# Patient Record
Sex: FEMALE | Race: WHITE | Employment: OTHER
[De-identification: names, ages, dates, MRNs, and addresses within clinical notes are randomized per-mention and may not be internally consistent; named-entity substitution may affect disease eponyms.]

---

## 2017-06-09 ENCOUNTER — RECORDS - HEALTHEAST (OUTPATIENT)
Dept: ADMINISTRATIVE | Facility: OTHER | Age: 67
End: 2017-06-09

## 2018-01-29 ENCOUNTER — RECORDS - HEALTHEAST (OUTPATIENT)
Dept: LAB | Facility: CLINIC | Age: 68
End: 2018-01-29

## 2018-01-29 LAB
ANION GAP SERPL CALCULATED.3IONS-SCNC: 11 MMOL/L (ref 5–18)
BUN SERPL-MCNC: 14 MG/DL (ref 8–22)
CALCIUM SERPL-MCNC: 10.2 MG/DL (ref 8.5–10.5)
CHLORIDE BLD-SCNC: 101 MMOL/L (ref 98–107)
CHOLEST SERPL-MCNC: 220 MG/DL
CO2 SERPL-SCNC: 29 MMOL/L (ref 22–31)
CREAT SERPL-MCNC: 0.85 MG/DL (ref 0.6–1.1)
FASTING STATUS PATIENT QL REPORTED: ABNORMAL
GFR SERPL CREATININE-BSD FRML MDRD: >60 ML/MIN/1.73M2
GLUCOSE BLD-MCNC: 95 MG/DL (ref 70–125)
HDLC SERPL-MCNC: 66 MG/DL
LDLC SERPL CALC-MCNC: 101 MG/DL
POTASSIUM BLD-SCNC: 4.2 MMOL/L (ref 3.5–5)
SODIUM SERPL-SCNC: 141 MMOL/L (ref 136–145)
TRIGL SERPL-MCNC: 266 MG/DL
TSH SERPL DL<=0.005 MIU/L-ACNC: 2.47 UIU/ML (ref 0.3–5)

## 2019-02-05 ENCOUNTER — RECORDS - HEALTHEAST (OUTPATIENT)
Dept: LAB | Facility: CLINIC | Age: 69
End: 2019-02-05

## 2019-02-05 LAB
ANION GAP SERPL CALCULATED.3IONS-SCNC: 13 MMOL/L (ref 5–18)
BUN SERPL-MCNC: 21 MG/DL (ref 8–22)
CALCIUM SERPL-MCNC: 10.7 MG/DL (ref 8.5–10.5)
CHLORIDE BLD-SCNC: 102 MMOL/L (ref 98–107)
CHOLEST SERPL-MCNC: 251 MG/DL
CO2 SERPL-SCNC: 25 MMOL/L (ref 22–31)
CREAT SERPL-MCNC: 0.91 MG/DL (ref 0.6–1.1)
FASTING STATUS PATIENT QL REPORTED: ABNORMAL
GFR SERPL CREATININE-BSD FRML MDRD: >60 ML/MIN/1.73M2
GLUCOSE BLD-MCNC: 94 MG/DL (ref 70–125)
HDLC SERPL-MCNC: 80 MG/DL
LDLC SERPL CALC-MCNC: 139 MG/DL
POTASSIUM BLD-SCNC: 4 MMOL/L (ref 3.5–5)
SODIUM SERPL-SCNC: 140 MMOL/L (ref 136–145)
TRIGL SERPL-MCNC: 161 MG/DL
TSH SERPL DL<=0.005 MIU/L-ACNC: 2.35 UIU/ML (ref 0.3–5)

## 2019-09-23 ENCOUNTER — HOSPITAL ENCOUNTER (EMERGENCY)
Facility: CLINIC | Age: 69
Discharge: HOME | End: 2019-09-23
Attending: EMERGENCY MEDICINE | Admitting: EMERGENCY MEDICINE
Payer: COMMERCIAL

## 2019-09-23 ENCOUNTER — APPOINTMENT (OUTPATIENT)
Dept: GENERAL RADIOLOGY | Facility: CLINIC | Age: 69
End: 2019-09-23
Attending: EMERGENCY MEDICINE
Payer: COMMERCIAL

## 2019-09-23 VITALS
DIASTOLIC BLOOD PRESSURE: 83 MMHG | TEMPERATURE: 97.4 F | SYSTOLIC BLOOD PRESSURE: 159 MMHG | OXYGEN SATURATION: 97 % | HEART RATE: 66 BPM | RESPIRATION RATE: 12 BRPM

## 2019-09-23 DIAGNOSIS — M25.522 PAIN IN JOINT INVOLVING UPPER ARM, LEFT: ICD-10-CM

## 2019-09-23 DIAGNOSIS — R07.9 CHEST PAIN, UNSPECIFIED TYPE: ICD-10-CM

## 2019-09-23 LAB
ANION GAP SERPL CALCULATED.3IONS-SCNC: 7 MMOL/L (ref 3–14)
BASOPHILS # BLD AUTO: 0.1 10E9/L (ref 0–0.2)
BASOPHILS NFR BLD AUTO: 1.1 %
BUN SERPL-MCNC: 16 MG/DL (ref 7–30)
CALCIUM SERPL-MCNC: 9.1 MG/DL (ref 8.5–10.1)
CHLORIDE SERPL-SCNC: 103 MMOL/L (ref 94–109)
CO2 SERPL-SCNC: 28 MMOL/L (ref 20–32)
CREAT SERPL-MCNC: 0.96 MG/DL (ref 0.52–1.04)
D DIMER PPP FEU-MCNC: 0.3 UG/ML FEU (ref 0–0.5)
DIFFERENTIAL METHOD BLD: NORMAL
EOSINOPHIL # BLD AUTO: 0.1 10E9/L (ref 0–0.7)
EOSINOPHIL NFR BLD AUTO: 1.4 %
ERYTHROCYTE [DISTWIDTH] IN BLOOD BY AUTOMATED COUNT: 12.9 % (ref 10–15)
GFR SERPL CREATININE-BSD FRML MDRD: 61 ML/MIN/{1.73_M2}
GLUCOSE SERPL-MCNC: 110 MG/DL (ref 70–99)
HCT VFR BLD AUTO: 43.7 % (ref 35–47)
HGB BLD-MCNC: 14.1 G/DL (ref 11.7–15.7)
IMM GRANULOCYTES # BLD: 0 10E9/L (ref 0–0.4)
IMM GRANULOCYTES NFR BLD: 0.1 %
LYMPHOCYTES # BLD AUTO: 1.5 10E9/L (ref 0.8–5.3)
LYMPHOCYTES NFR BLD AUTO: 18.7 %
MCH RBC QN AUTO: 30.3 PG (ref 26.5–33)
MCHC RBC AUTO-ENTMCNC: 32.3 G/DL (ref 31.5–36.5)
MCV RBC AUTO: 94 FL (ref 78–100)
MONOCYTES # BLD AUTO: 0.5 10E9/L (ref 0–1.3)
MONOCYTES NFR BLD AUTO: 6.5 %
NEUTROPHILS # BLD AUTO: 5.8 10E9/L (ref 1.6–8.3)
NEUTROPHILS NFR BLD AUTO: 72.2 %
NRBC # BLD AUTO: 0 10*3/UL
NRBC BLD AUTO-RTO: 0 /100
PLATELET # BLD AUTO: 221 10E9/L (ref 150–450)
POTASSIUM SERPL-SCNC: 3.6 MMOL/L (ref 3.4–5.3)
RBC # BLD AUTO: 4.66 10E12/L (ref 3.8–5.2)
SODIUM SERPL-SCNC: 138 MMOL/L (ref 133–144)
TROPONIN I SERPL-MCNC: <0.015 UG/L (ref 0–0.04)
WBC # BLD AUTO: 8 10E9/L (ref 4–11)

## 2019-09-23 PROCEDURE — 80048 BASIC METABOLIC PNL TOTAL CA: CPT | Performed by: EMERGENCY MEDICINE

## 2019-09-23 PROCEDURE — 84484 ASSAY OF TROPONIN QUANT: CPT | Performed by: EMERGENCY MEDICINE

## 2019-09-23 PROCEDURE — 85379 FIBRIN DEGRADATION QUANT: CPT | Performed by: EMERGENCY MEDICINE

## 2019-09-23 PROCEDURE — 85025 COMPLETE CBC W/AUTO DIFF WBC: CPT | Performed by: EMERGENCY MEDICINE

## 2019-09-23 PROCEDURE — 99285 EMERGENCY DEPT VISIT HI MDM: CPT | Mod: 25

## 2019-09-23 PROCEDURE — 93005 ELECTROCARDIOGRAM TRACING: CPT

## 2019-09-23 PROCEDURE — 71046 X-RAY EXAM CHEST 2 VIEWS: CPT

## 2019-09-23 RX ORDER — BUPROPION HYDROCHLORIDE 150 MG/1
TABLET ORAL
COMMUNITY
Start: 2017-10-11

## 2019-09-23 RX ORDER — LEVOTHYROXINE SODIUM 100 UG/1
100 TABLET ORAL
COMMUNITY
Start: 2017-10-03

## 2019-09-23 RX ORDER — CITALOPRAM HYDROBROMIDE 10 MG/1
TABLET ORAL
COMMUNITY
Start: 2017-10-11

## 2019-09-23 ASSESSMENT — ENCOUNTER SYMPTOMS
SHORTNESS OF BREATH: 0
MYALGIAS: 1

## 2019-09-23 NOTE — ED TRIAGE NOTES
Pt states that she has been having pain in the left shoulder pain that feels like its more her muscle. Pt states that she feels her heart is racing because shes in the ER. Initial BP is 204/107 and HR of 103. Pt states that her daughter told her to come in. Alert and oriented x4

## 2019-09-23 NOTE — ED PROVIDER NOTES
History     Chief Complaint:  Arm Pain     HPI   Dea Rider is a 69 year old female who presents to the emergency department for evaluation of left arm pain. The patient reports she went to bed last night at baseline but awoke this morning around 0630 with left upper arm pain, accompanied by mild chest pain, worse with deep breaths. The chest pain improves at rest and does not change with movement; the arm pain worsens with movement of the arm. She states this pain worsened as the day progressed, and it worsened further when she helped her neighbor get into her car around 1230 today. The patient called her PCP out of concern for these symptoms and was instructed to present to the ED. She denies any recent travel. She also denies any shortness of breath.    Cardiac Risk Factors:  SEX:              female  Tobacco:              Negative  Hypertension:       Negative  Diabetes:             Negative  Lipids:                Negative  Personal history:  Negative  Family History:       Negative    PE and DVT Risk Factors:  Personal hx of PE/DVT:  Negative  Family hx of PE/DVT:  Negative  Recent travel:    Negative  Recent surgery:   Negative  Other immobilizations:  Negative  Hx cancer:    Negative  Hormone use:   Negative     Allergies:  Codeine  Morphine  Penicillins     Medications:    Wellbutrin  Celexa  Levothyroxine     Past Medical History:    Depression  Anxiety  Hypothyroidism    Past Surgical History:    Mastectomy   Tonsillectomy    Family History:    No past pertinent family history.    Social History:  Presents with son, daughter, son-in-law, and granddaughter.  Never smoker.  Positive for alcohol use.   Marital Status:       Review of Systems   Respiratory: Negative for shortness of breath.    Cardiovascular: Positive for chest pain.   Musculoskeletal: Positive for myalgias.   All other systems reviewed and are negative.    Physical Exam     Patient Vitals for the past 24 hrs:   BP Temp Temp src Pulse  Heart Rate Resp SpO2   09/23/19 1900 (!) 175/92 -- -- 63 74 24 99 %   09/23/19 1830 -- -- -- 67 73 -- 96 %   09/23/19 1815 (!) 138/108 -- -- 66 69 -- 93 %   09/23/19 1800 (!) 151/66 -- -- 69 68 -- 93 %   09/23/19 1745 (!) 121/111 -- -- 74 75 -- 98 %   09/23/19 1730 (!) 147/83 -- -- 72 71 -- 97 %   09/23/19 1715 (!) 159/92 -- -- 75 89 -- 98 %   09/23/19 1700 (!) 158/83 -- -- 79 78 -- 99 %   09/23/19 1645 (!) 179/111 -- -- 91 85 -- 100 %   09/23/19 1630 (!) 174/91 -- -- 97 -- -- 98 %   09/23/19 1622 (!) 204/107 97.4  F (36.3  C) Oral -- 103 18 100 %     Physical Exam  General: Patient is alert and interactive when I enter the room  Head:  The scalp, face, and head appear normal  Eyes:  Conjunctivae are normal  ENT:    The nose is normal    Pinnae are normal    External acoustic canals are normal  Neck:  Trachea midline  CV:  Pulses are normal, RRR.    Resp:  No respiratory distress, CTAB  Abdomen:      Soft, non-tender, non-distended  Musc:  Normal muscular tone    No major joint effusions    No asymmetric leg swelling  Skin:  No rash or lesions noted  Neuro:  Speech is normal and fluent. Face is symmetric.     Moving all extremities well.   Psych: Awake. Alert.  Normal affect.  Appropriate interactions.    Emergency Department Course   ECG:  Time: 1624  Vent. Rate 94 bpm. MI interval 152. QRS duration 114. QT/QTc 354/442. P-R-T axis 65 -22 82.  Normal sinus rhythm with sinus arrhythmia.  Minimal voltage criteria for LVH, may be normal variant.  Borderline ECG.  Read time: 1625    Imaging:  Radiographic findings were communicated with the patient who voiced understanding of the findings.    XR Chest, 2 Views:  Negative chest.  As per radiology.    Laboratory:  CBC: WBC: 8.0, HGB: 14.14, PLT: 221  BMP: Glucose 110 (H), o/w WNL (Creatinine: 0.9)    D dimer: 0.3    1637 Troponin I: <0.015    Emergency Department Course:  Nursing notes and vitals reviewed. 1649 I performed an exam of the patient as documented above.      EKG obtained in the ED, see results above.     Blood drawn. This was sent to the lab for further testing, results above.    The patient was sent for a XR Chest while in the emergency department, findings above.     2007 I rechecked the patient and discussed the results of her workup thus far.     Findings and plan explained to the Patient. Patient discharged home with instructions regarding supportive care, medications, and reasons to return. The importance of close follow-up was reviewed.     I personally reviewed the laboratory results with the Patient and answered all related questions prior to discharge.    Impression & Plan      Medical Decision Making:  Dea Rider is a 69 year old female who presents with chest pain.  Today's work up in the Emergency Department is negative.  The differential diagnosis of chest pain is broad and includes life threatening etiologies such as acute coronary syndrome, myocardial infarction, pulmonary embolism, acute aortic dissection, amongst others.  The patient's EKG was nonischemic and troponin was normal.  The chest pain symptom complex would be atypical for coronary ischemia.  The patient is low risk for PE and has a negative D-dimer; I feel the risks of CT imaging outweighs any benefits.  Chest xray reveals no evidence of pneumonia or pneumothorax.  No serious etiology for the chest pain were detected today during this visit.  Since patient has had several hours (> 6 hours) of sonia, I think a one time troponin is sufficient. Given her age and HTN here, I do think it would be reasonable to get an outpatient stress test. This was ordered. Patient should follow up with primary care is indicated should the pain continue, as further work up may be performed; this was made clear to the patient, who understands.     Diagnosis:    ICD-10-CM   1. Chest pain, unspecified type R07.9   2. Pain in joint involving upper arm, left M25.522       Disposition:  discharged to home    I,  Gurdeep Donohue, am serving as a scribe on 9/23/2019 at 4:26 PM to personally document services performed by Patricia Mittal MD based on my observations and the provider's statements to me.     Gurdeep Donohue  9/23/2019   [facility] EMERGENCY DEPARTMENT       Patricia Mittal MD  09/26/19 1249

## 2019-09-23 NOTE — ED AVS SNAPSHOT
[facility] Emergency Department  [address]  [facility] [address]  Phone:  [phone]  Fax:  [phone]                                    Dea Rider   MRN: [id number]    Department:  [facility] Emergency Department   Date of Visit:  9/23/2019           After Visit Summary Signature Page    I have received my discharge instructions, and my questions have been answered. I have discussed any challenges I see with this plan with the nurse or doctor.    ..........................................................................................................................................  Patient/Patient Representative Signature      ..........................................................................................................................................  Patient Representative Print Name and Relationship to Patient    ..................................................               ................................................  Date                                   Time    ..........................................................................................................................................  Reviewed by Signature/Title    ...................................................              ..............................................  Date                                               Time          22EPIC Rev 08/18

## 2019-09-24 LAB — INTERPRETATION ECG - MUSE: NORMAL

## 2019-09-24 NOTE — ED NOTES
PIV removed, VSS, Pt verbalized understanding of discharge instructions and ambulated to lobby with steady gait.

## 2019-09-25 ENCOUNTER — HOSPITAL ENCOUNTER (OUTPATIENT)
Dept: CARDIOLOGY | Facility: CLINIC | Age: 69
Discharge: HOME | End: 2019-09-25
Attending: EMERGENCY MEDICINE | Admitting: EMERGENCY MEDICINE
Payer: COMMERCIAL

## 2019-09-25 DIAGNOSIS — R07.9 CHEST PAIN, UNSPECIFIED TYPE: ICD-10-CM

## 2019-09-25 PROCEDURE — 93016 CV STRESS TEST SUPVJ ONLY: CPT | Performed by: INTERNAL MEDICINE

## 2019-09-25 PROCEDURE — 93350 STRESS TTE ONLY: CPT | Mod: 26 | Performed by: INTERNAL MEDICINE

## 2019-09-25 PROCEDURE — 93018 CV STRESS TEST I&R ONLY: CPT | Performed by: INTERNAL MEDICINE

## 2019-09-25 PROCEDURE — 93321 DOPPLER ECHO F-UP/LMTD STD: CPT | Mod: 26 | Performed by: INTERNAL MEDICINE

## 2019-09-25 PROCEDURE — 40000264 ECHO STRESS ECHOCARDIOGRAM

## 2019-09-25 PROCEDURE — 25500064 ZZH RX 255 OP 636: Performed by: EMERGENCY MEDICINE

## 2019-09-25 PROCEDURE — 93325 DOPPLER ECHO COLOR FLOW MAPG: CPT | Mod: 26 | Performed by: INTERNAL MEDICINE

## 2019-09-25 RX ADMIN — HUMAN ALBUMIN MICROSPHERES AND PERFLUTREN 9 ML: 10; .22 INJECTION, SOLUTION INTRAVENOUS at 15:35

## 2019-09-26 PROBLEM — Z85.3 PERSONAL HISTORY OF MALIGNANT NEOPLASM OF BREAST: Status: ACTIVE | Noted: 2019-09-26

## 2019-09-26 PROBLEM — E03.9 ACQUIRED HYPOTHYROIDISM: Status: ACTIVE | Noted: 2019-09-26

## 2019-09-26 PROBLEM — F51.05 INSOMNIA RELATED TO ANOTHER MENTAL DISORDER: Status: ACTIVE | Noted: 2019-09-26

## 2019-09-26 PROBLEM — J44.9 CHRONIC AIRWAY OBSTRUCTION (H): Status: ACTIVE | Noted: 2019-09-26

## 2019-09-26 PROBLEM — F41.9 ANXIETY: Status: ACTIVE | Noted: 2019-09-26

## 2019-09-26 PROBLEM — J30.9 ALLERGIC RHINITIS: Status: ACTIVE | Noted: 2019-09-26

## 2019-09-26 PROBLEM — R03.0 ELEVATED BP WITHOUT DIAGNOSIS OF HYPERTENSION: Status: ACTIVE | Noted: 2019-09-26

## 2019-09-26 PROBLEM — G47.30 SLEEP APNEA: Status: ACTIVE | Noted: 2019-09-26

## 2019-09-26 PROBLEM — E78.5 HYPERLIPIDEMIA: Status: ACTIVE | Noted: 2019-09-26

## 2019-09-26 PROBLEM — N84.0 ENDOMETRIAL POLYP: Status: ACTIVE | Noted: 2019-09-26

## 2019-09-26 PROBLEM — F33.40 RECURRENT MAJOR DEPRESSION IN REMISSION (H): Status: ACTIVE | Noted: 2019-09-26

## 2019-10-03 ENCOUNTER — OFFICE VISIT (OUTPATIENT)
Dept: CARDIOLOGY | Facility: CLINIC | Age: 69
End: 2019-10-03
Payer: COMMERCIAL

## 2019-10-03 VITALS
SYSTOLIC BLOOD PRESSURE: 138 MMHG | WEIGHT: 189.4 LBS | HEIGHT: 66 IN | HEART RATE: 80 BPM | DIASTOLIC BLOOD PRESSURE: 76 MMHG | BODY MASS INDEX: 30.44 KG/M2

## 2019-10-03 DIAGNOSIS — R07.9 CHEST PAIN, UNSPECIFIED TYPE: Primary | ICD-10-CM

## 2019-10-03 DIAGNOSIS — R94.39 ABNORMAL CARDIOVASCULAR STRESS TEST: ICD-10-CM

## 2019-10-03 PROCEDURE — 99204 OFFICE O/P NEW MOD 45 MIN: CPT | Performed by: INTERNAL MEDICINE

## 2019-10-03 RX ORDER — VITS A,C,E/LUTEIN/MINERALS 300MCG-200
1 TABLET ORAL DAILY
COMMUNITY

## 2019-10-03 ASSESSMENT — MIFFLIN-ST. JEOR: SCORE: 1410.86

## 2019-10-03 NOTE — PROGRESS NOTES
HPI and Plan:   See dictation    Orders Placed This Encounter   Procedures     CTA Angiogram coronary artery       Orders Placed This Encounter   Medications     TRAZODONE HCL PO     Sig: Take by mouth as needed for sleep     Multiple Vitamins-Minerals (MULTIVITAMIN PO)     Sig: Take by mouth daily     aspirin (ASA) 81 MG tablet     Sig: Take 81 mg by mouth daily     Cholecalciferol (VITAMIN D3 PO)     Sig: Take 600 Units by mouth daily     multivitamin (OCUVITE) TABS tablet     Sig: Take 1 tablet by mouth daily       There are no discontinued medications.      Encounter Diagnoses   Name Primary?     Chest pain, unspecified type Yes     Abnormal cardiovascular stress test        CURRENT MEDICATIONS:  Current Outpatient Medications   Medication Sig Dispense Refill     aspirin (ASA) 81 MG tablet Take 81 mg by mouth daily       buPROPion (WELLBUTRIN XL) 150 MG 24 hr tablet TAKE 1 TABLET BY MOUTH EVERY 24 HOURS       Cholecalciferol (VITAMIN D3 PO) Take 600 Units by mouth daily       citalopram (CELEXA) 10 MG tablet TAKE 1 TABLET BY MOUTH DAILY FOR 90 DAYS       levothyroxine (SYNTHROID/LEVOTHROID) 100 MCG tablet Take 100 mcg by mouth       Multiple Vitamins-Minerals (MULTIVITAMIN PO) Take by mouth daily       multivitamin (OCUVITE) TABS tablet Take 1 tablet by mouth daily       TRAZODONE HCL PO Take by mouth as needed for sleep         ALLERGIES     Allergies   Allergen Reactions     Codeine Nausea and Vomiting     Morphine Nausea and Vomiting     Penicillins Hives       PAST MEDICAL HISTORY:  Past Medical History:   Diagnosis Date     Elevated BP without diagnosis of hypertension 9/26/2019     Endometrial polyp 9/26/2019       PAST SURGICAL HISTORY:  No past surgical history on file.    FAMILY HISTORY:  Family History   Problem Relation Age of Onset     Pancreatic Cancer Mother      Heart Failure Father      No Known Problems Sister      No Known Problems Brother      No Known Problems Maternal Grandmother      Other  - See Comments Maternal Grandfather         epilepsy     Heart Failure Paternal Grandmother      Cerebral aneurysm Paternal Grandfather      No Known Problems Sister        SOCIAL HISTORY:  Social History     Socioeconomic History     Marital status:      Spouse name: None     Number of children: None     Years of education: None     Highest education level: None   Occupational History     None   Social Needs     Financial resource strain: None     Food insecurity:     Worry: None     Inability: None     Transportation needs:     Medical: None     Non-medical: None   Tobacco Use     Smoking status: Never Smoker     Smokeless tobacco: Never Used   Substance and Sexual Activity     Alcohol use: Yes     Comment: 1-2 glasses of wine nightly     Drug use: None     Sexual activity: None   Lifestyle     Physical activity:     Days per week: None     Minutes per session: None     Stress: None   Relationships     Social connections:     Talks on phone: None     Gets together: None     Attends Christian service: None     Active member of club or organization: None     Attends meetings of clubs or organizations: None     Relationship status: None     Intimate partner violence:     Fear of current or ex partner: None     Emotionally abused: None     Physically abused: None     Forced sexual activity: None   Other Topics Concern     None   Social History Narrative     None       Review of Systems:  Skin:  Positive for bruising     Eyes:  Positive for glasses    ENT:  Positive for sinus trouble allergies  Respiratory:  Positive for sleep apnea currently lost her mouth guard   Cardiovascular:    Positive for;chest pain;fatigue pain in left arm  Gastroenterology: Negative      Genitourinary:  Negative      Musculoskeletal:  Positive for joint pain;arthritis;nocturnal cramping 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  Neurologic:  Negative      Psychiatric:  Positive for excessive stress;anxiety;depression;sleep disturbances    Heme/Lymph/Imm:  " Positive for allergies RX  Endocrine:  Positive for thyroid disorder      Physical Exam:  Vitals: /76 (BP Location: Right arm, Patient Position: Chair, Cuff Size: Adult Regular)   Pulse 80   Ht 1.676 m (5' 6\")   Wt 85.9 kg (189 lb 6.4 oz)   BMI 30.57 kg/m      Constitutional:  cooperative, alert and oriented, well developed, well nourished, in no acute distress overweight      Skin:  warm and dry to the touch          Head:  normocephalic        Eyes:  pupils equal and round        Lymph:No Cervical lymphadenopathy present     ENT:           Neck:  carotid pulses are full and equal bilaterally, JVP normal, no carotid bruit        Respiratory:  normal breath sounds, clear to auscultation, normal A-P diameter, normal symmetry, normal respiratory excursion, no use of accessory muscles         Cardiac: regular rhythm, normal S1/S2, no S3 or S4, apical impulse not displaced, no murmurs, gallops or rubs                pulses full and equal                                        GI:  not assessed this visit        Extremities and Muscular Skeletal:  no deformities, clubbing, cyanosis, erythema observed;no edema              Neurological:  no gross motor deficits;affect appropriate        Psych:  Alert and Oriented x 3        CC  Anna Kam MD  [facility]  [address] [place] [address]              "

## 2019-10-03 NOTE — LETTER
10/3/2019      Anna Kam MD  [facility] [address]  [facility] [address]      RE: Dea Rider       Dear Colleague,    I had the pleasure of seeing Dea Rider in the [facility].    Service Date: 10/03/2019      CARDIOLOGY CONSULTATION      PRIMARY CARE PHYSICIAN:  Anna Kam MD.      INDICATION FOR CARDIAC CONSULTATION:  Chest discomfort and abnormal stress echocardiogram.      HISTORY OF PRESENT ILLNESS:  It is my pleasure to see your patient, Dea Rider.  She is an extremely nice 69-year-old patient who developed chest discomfort approximately a week and a half ago.  The patient was walking with her granddaughter and their dog and they came across their neighbor who fell and landed on her face.  When the person's  came around in his car to help his wife, the patient helped in lifting the patient into the car.  The following morning, she woke up with discomfort on the right side of her chest and in her left arm.  The discomfort had no aggravating or relieving factors.  It was there for hours.  She ruled out for myocardial infarct with a troponin of less than 0.015.  EKG at the time of the chest discomfort was essentially normal with no ischemic changes.  She was then referred for stress echocardiogram which was performed on 09/25.  The patient was only able to exercise for 4 minutes 30 seconds which reduces the accuracy of the test.  Six minutes of exercise is typically regarded as being a sufficient amount of exercise.  The patient had no chest pain during the stress test, but the image quality was difficult and it was felt by the reader that there was possibly evidence of apical lateral wall hypokinesis.  The stress EKG was normal.  The Duke treadmill score was intermediate risk.  The patient still has some discomfort in the chest and in the arm.  It is significantly better now, however.  She has few risk factors for  coronary artery disease.  Though there are no lipids available in the chart, she tells me that her lipids are normal with the exception of mild hypertriglyceridemia.  There is no family history of coronary artery disease.  Her father did die of congestive heart failure.  She never smoked, she is not diabetic, and she is not hypertensive.  Her blood pressure today was 138/76.      IMPRESSION:   1.  Abnormal stress echocardiogram and chest discomfort.  The inability to achieve 6 minutes of exercise firstly is an abnormal factor on the stress test.  Secondly, there is the possibility that there is a wall motion abnormality on the stress echocardiogram with the apical lateral wall, possibly being hypokinetic.  However, the chest discomfort is atypical and may have been caused by lifting by lifting a person who had fallen.  She does have some risk factors for coronary artery disease, but few.   2.  Mild obesity with a BMI of 30.57.      PLAN:  We will obtain a CT angiogram to determine if there is flow-limiting coronary artery disease.  I have explained to the patient that this is performed in [place] and she is willing to go there.  We will call the patient with the results of the CT scan.  Obviously, if the CT scan shows a flow-limiting lesion, we will be seeing her very soon and would probably be ordering an invasive coronary angiogram in that situation.  It has been my pleasure to be involved in the care of this very nice patient.  We look forward to seeing her again.      cc:   [place] [address]         JANAY SALAZAR MD, [facility]             D: 10/03/2019   T: 10/03/2019   MT: SATHYA      Name:     CHLOE VILLEGAS   MRN:      [id number]        Account:      [id number]   :      1950           Service Date: 10/03/2019      Document: [id number]         Outpatient Encounter Medications as of 10/3/2019   Medication  Sig Dispense Refill     aspirin (ASA) 81 MG tablet Take 81 mg by mouth daily       buPROPion (WELLBUTRIN XL) 150 MG 24 hr tablet TAKE 1 TABLET BY MOUTH EVERY 24 HOURS       Cholecalciferol (VITAMIN D3 PO) Take 600 Units by mouth daily       citalopram (CELEXA) 10 MG tablet TAKE 1 TABLET BY MOUTH DAILY FOR 90 DAYS       levothyroxine (SYNTHROID/LEVOTHROID) 100 MCG tablet Take 100 mcg by mouth       Multiple Vitamins-Minerals (MULTIVITAMIN PO) Take by mouth daily       multivitamin (OCUVITE) TABS tablet Take 1 tablet by mouth daily       TRAZODONE HCL PO Take by mouth as needed for sleep       No facility-administered encounter medications on file as of 10/3/2019.        Again, thank you for allowing me to participate in the care of your patient.      Sincerely,    Jacob Santiago MD, MD     [facility]

## 2019-10-03 NOTE — PROGRESS NOTES
Service Date: 10/03/2019      CARDIOLOGY CONSULTATION      PRIMARY CARE PHYSICIAN:  Anna Kam MD.      INDICATION FOR CARDIAC CONSULTATION:  Chest discomfort and abnormal stress echocardiogram.      HISTORY OF PRESENT ILLNESS:  It is my pleasure to see your patient, Dea Rider.  She is an extremely nice 69-year-old patient who developed chest discomfort approximately a week and a half ago.  The patient was walking with her granddaughter and their dog and they came across their neighbor who fell and landed on her face.  When the person's  came around in his car to help his wife, the patient helped in lifting the patient into the car.  The following morning, she woke up with discomfort on the right side of her chest and in her left arm.  The discomfort had no aggravating or relieving factors.  It was there for hours.  She ruled out for myocardial infarct with a troponin of less than 0.015.  EKG at the time of the chest discomfort was essentially normal with no ischemic changes.  She was then referred for stress echocardiogram which was performed on 09/25.  The patient was only able to exercise for 4 minutes 30 seconds which reduces the accuracy of the test.  Six minutes of exercise is typically regarded as being a sufficient amount of exercise.  The patient had no chest pain during the stress test, but the image quality was difficult and it was felt by the reader that there was possibly evidence of apical lateral wall hypokinesis.  The stress EKG was normal.  The Duke treadmill score was intermediate risk.  The patient still has some discomfort in the chest and in the arm.  It is significantly better now, however.  She has few risk factors for coronary artery disease.  Though there are no lipids available in the chart, she tells me that her lipids are normal with the exception of mild hypertriglyceridemia.  There is no family history of coronary artery disease.  Her father did die of congestive  heart failure.  She never smoked, she is not diabetic, and she is not hypertensive.  Her blood pressure today was 138/76.      IMPRESSION:   1.  Abnormal stress echocardiogram and chest discomfort.  The inability to achieve 6 minutes of exercise firstly is an abnormal factor on the stress test.  Secondly, there is the possibility that there is a wall motion abnormality on the stress echocardiogram with the apical lateral wall, possibly being hypokinetic.  However, the chest discomfort is atypical and may have been caused by lifting by lifting a person who had fallen.  She does have some risk factors for coronary artery disease, but few.   2.  Mild obesity with a BMI of 30.57.      PLAN:  We will obtain a CT angiogram to determine if there is flow-limiting coronary artery disease.  I have explained to the patient that this is performed in [place] and she is willing to go there.  We will call the patient with the results of the CT scan.  Obviously, if the CT scan shows a flow-limiting lesion, we will be seeing her very soon and would probably be ordering an invasive coronary angiogram in that situation.  It has been my pleasure to be involved in the care of this very nice patient.  We look forward to seeing her again.      cc:   Anna Kam MD   [address]  [address]         JANAY SALAZAR MD, [facility]             D: 10/03/2019   T: 10/03/2019   MT: SATHYA      Name:     CHLOE VILLEGAS   MRN:      [id number]        Account:      [id number]   :      1950           Service Date: 10/03/2019      Document: [id number]

## 2019-10-03 NOTE — LETTER
10/3/2019    Anna Kam MD  [facility] [address] [facility] [address]    RE: Dea Rider       Dear Colleague,    I had the pleasure of seeing Dea Rider in the [facility].    HPI and Plan:   See dictation    Orders Placed This Encounter   Procedures     CTA Angiogram coronary artery       Orders Placed This Encounter   Medications     TRAZODONE HCL PO     Sig: Take by mouth as needed for sleep     Multiple Vitamins-Minerals (MULTIVITAMIN PO)     Sig: Take by mouth daily     aspirin (ASA) 81 MG tablet     Sig: Take 81 mg by mouth daily     Cholecalciferol (VITAMIN D3 PO)     Sig: Take 600 Units by mouth daily     multivitamin (OCUVITE) TABS tablet     Sig: Take 1 tablet by mouth daily       There are no discontinued medications.      Encounter Diagnoses   Name Primary?     Chest pain, unspecified type Yes     Abnormal cardiovascular stress test        CURRENT MEDICATIONS:  Current Outpatient Medications   Medication Sig Dispense Refill     aspirin (ASA) 81 MG tablet Take 81 mg by mouth daily       buPROPion (WELLBUTRIN XL) 150 MG 24 hr tablet TAKE 1 TABLET BY MOUTH EVERY 24 HOURS       Cholecalciferol (VITAMIN D3 PO) Take 600 Units by mouth daily       citalopram (CELEXA) 10 MG tablet TAKE 1 TABLET BY MOUTH DAILY FOR 90 DAYS       levothyroxine (SYNTHROID/LEVOTHROID) 100 MCG tablet Take 100 mcg by mouth       Multiple Vitamins-Minerals (MULTIVITAMIN PO) Take by mouth daily       multivitamin (OCUVITE) TABS tablet Take 1 tablet by mouth daily       TRAZODONE HCL PO Take by mouth as needed for sleep         ALLERGIES     Allergies   Allergen Reactions     Codeine Nausea and Vomiting     Morphine Nausea and Vomiting     Penicillins Hives       PAST MEDICAL HISTORY:  Past Medical History:   Diagnosis Date     Elevated BP without diagnosis of hypertension 9/26/2019     Endometrial polyp 9/26/2019       PAST SURGICAL HISTORY:  No past surgical  history on file.    FAMILY HISTORY:  Family History   Problem Relation Age of Onset     Pancreatic Cancer Mother      Heart Failure Father      No Known Problems Sister      No Known Problems Brother      No Known Problems Maternal Grandmother      Other - See Comments Maternal Grandfather         epilepsy     Heart Failure Paternal Grandmother      Cerebral aneurysm Paternal Grandfather      No Known Problems Sister        SOCIAL HISTORY:  Social History     Socioeconomic History     Marital status:      Spouse name: None     Number of children: None     Years of education: None     Highest education level: None   Occupational History     None   Social Needs     Financial resource strain: None     Food insecurity:     Worry: None     Inability: None     Transportation needs:     Medical: None     Non-medical: None   Tobacco Use     Smoking status: Never Smoker     Smokeless tobacco: Never Used   Substance and Sexual Activity     Alcohol use: Yes     Comment: 1-2 glasses of wine nightly     Drug use: None     Sexual activity: None   Lifestyle     Physical activity:     Days per week: None     Minutes per session: None     Stress: None   Relationships     Social connections:     Talks on phone: None     Gets together: None     Attends Shinto service: None     Active member of club or organization: None     Attends meetings of clubs or organizations: None     Relationship status: None     Intimate partner violence:     Fear of current or ex partner: None     Emotionally abused: None     Physically abused: None     Forced sexual activity: None   Other Topics Concern     None   Social History Narrative     None       Review of Systems:  Skin:  Positive for bruising     Eyes:  Positive for glasses    ENT:  Positive for sinus trouble allergies  Respiratory:  Positive for sleep apnea currently lost her mouth guard   Cardiovascular:    Positive for;chest pain;fatigue pain in left arm  Gastroenterology: Negative     "  Genitourinary:  Negative      Musculoskeletal:  Positive for joint pain;arthritis;nocturnal cramping 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  Neurologic:  Negative      Psychiatric:  Positive for excessive stress;anxiety;depression;sleep disturbances    Heme/Lymph/Imm:  Positive for allergies RX  Endocrine:  Positive for thyroid disorder      Physical Exam:  Vitals: /76 (BP Location: Right arm, Patient Position: Chair, Cuff Size: Adult Regular)   Pulse 80   Ht 1.676 m (5' 6\")   Wt 85.9 kg (189 lb 6.4 oz)   BMI 30.57 kg/m       Constitutional:  cooperative, alert and oriented, well developed, well nourished, in no acute distress overweight      Skin:  warm and dry to the touch          Head:  normocephalic        Eyes:  pupils equal and round        Lymph:No Cervical lymphadenopathy present     ENT:           Neck:  carotid pulses are full and equal bilaterally, JVP normal, no carotid bruit        Respiratory:  normal breath sounds, clear to auscultation, normal A-P diameter, normal symmetry, normal respiratory excursion, no use of accessory muscles         Cardiac: regular rhythm, normal S1/S2, no S3 or S4, apical impulse not displaced, no murmurs, gallops or rubs                pulses full and equal                                        GI:  not assessed this visit        Extremities and Muscular Skeletal:  no deformities, clubbing, cyanosis, erythema observed;no edema              Neurological:  no gross motor deficits;affect appropriate        Psych:  Alert and Oriented x 3        CC  [place] [address]                Thank you for allowing me to participate in the care of your patient.      Sincerely,     Jacob Santiago MD, MD     [facility] [place] [facility]    cc:   Anna Kam MD  [place] [address] [place] [address]        "

## 2019-10-11 ENCOUNTER — HOSPITAL ENCOUNTER (OUTPATIENT)
Dept: CARDIOLOGY | Facility: CLINIC | Age: 69
Discharge: HOME | End: 2019-10-11
Attending: INTERNAL MEDICINE | Admitting: INTERNAL MEDICINE
Payer: COMMERCIAL

## 2019-10-11 VITALS — SYSTOLIC BLOOD PRESSURE: 139 MMHG | DIASTOLIC BLOOD PRESSURE: 66 MMHG | HEART RATE: 60 BPM

## 2019-10-11 DIAGNOSIS — R07.9 CHEST PAIN, UNSPECIFIED TYPE: ICD-10-CM

## 2019-10-11 DIAGNOSIS — R94.39 ABNORMAL CARDIOVASCULAR STRESS TEST: ICD-10-CM

## 2019-10-11 PROCEDURE — 25000132 ZZH RX MED GY IP 250 OP 250 PS 637: Performed by: INTERNAL MEDICINE

## 2019-10-11 PROCEDURE — 25000128 H RX IP 250 OP 636: Performed by: INTERNAL MEDICINE

## 2019-10-11 PROCEDURE — 75574 CT ANGIO HRT W/3D IMAGE: CPT | Mod: 26 | Performed by: INTERNAL MEDICINE

## 2019-10-11 PROCEDURE — 75574 CT ANGIO HRT W/3D IMAGE: CPT

## 2019-10-11 RX ORDER — IOPAMIDOL 755 MG/ML
500 INJECTION, SOLUTION INTRAVASCULAR ONCE
Status: COMPLETED | OUTPATIENT
Start: 2019-10-11 | End: 2019-10-11

## 2019-10-11 RX ORDER — ACYCLOVIR 200 MG/1
0-1 CAPSULE ORAL
Status: DISCONTINUED | OUTPATIENT
Start: 2019-10-11 | End: 2019-10-12 | Stop reason: HOSPADM

## 2019-10-11 RX ORDER — NITROGLYCERIN 0.4 MG/1
0.4 TABLET SUBLINGUAL
Status: DISCONTINUED | OUTPATIENT
Start: 2019-10-11 | End: 2019-10-12 | Stop reason: HOSPADM

## 2019-10-11 RX ORDER — METOPROLOL TARTRATE 50 MG
50-100 TABLET ORAL
Status: COMPLETED | OUTPATIENT
Start: 2019-10-11 | End: 2019-10-11

## 2019-10-11 RX ORDER — METOPROLOL TARTRATE 1 MG/ML
5-15 INJECTION, SOLUTION INTRAVENOUS
Status: DISCONTINUED | OUTPATIENT
Start: 2019-10-11 | End: 2019-10-12 | Stop reason: HOSPADM

## 2019-10-11 RX ADMIN — METOPROLOL TARTRATE 100 MG: 50 TABLET ORAL at 11:28

## 2019-10-11 RX ADMIN — NITROGLYCERIN 0.4 MG: 0.4 TABLET SUBLINGUAL at 12:42

## 2019-10-11 RX ADMIN — SODIUM CHLORIDE 100 ML: 9 INJECTION, SOLUTION INTRAVENOUS at 12:52

## 2019-10-11 RX ADMIN — IOPAMIDOL 120 ML: 755 INJECTION, SOLUTION INTRAVENOUS at 12:52

## 2019-10-15 ENCOUNTER — TELEPHONE (OUTPATIENT)
Dept: CARDIOLOGY | Facility: CLINIC | Age: 69
End: 2019-10-15

## 2019-10-15 NOTE — TELEPHONE ENCOUNTER
Reviewed CTa showing   1. No evidence of coronary artery calcific.  2. Widely patent coronary arteries without detectable stenosis  3. Please review Radiology report for incidental noncardiac findings that will follow separately.    Radiology report showed Visualized soft tissues are unremarkable.    Attempted to call pt with results, left message for pt to call back.   Will message Dr. Santiago to review. Taylor JAMISON     Addendum: pt called back, informed that her no calcification & stenosis were detected in her coronary arteries. Taylor JAMISON

## 2019-10-15 NOTE — TELEPHONE ENCOUNTER
Widely patent normal coronary arteries. Non cardiac chest pain. False positive stress test . Would let patient know.

## 2020-05-04 ENCOUNTER — RECORDS - HEALTHEAST (OUTPATIENT)
Dept: LAB | Facility: CLINIC | Age: 70
End: 2020-05-04

## 2020-05-04 LAB
ANION GAP SERPL CALCULATED.3IONS-SCNC: 10 MMOL/L (ref 5–18)
BUN SERPL-MCNC: 18 MG/DL (ref 8–22)
CALCIUM SERPL-MCNC: 10.1 MG/DL (ref 8.5–10.5)
CHLORIDE BLD-SCNC: 104 MMOL/L (ref 98–107)
CHOLEST SERPL-MCNC: 239 MG/DL
CO2 SERPL-SCNC: 27 MMOL/L (ref 22–31)
CREAT SERPL-MCNC: 0.82 MG/DL (ref 0.6–1.1)
FASTING STATUS PATIENT QL REPORTED: ABNORMAL
GFR SERPL CREATININE-BSD FRML MDRD: >60 ML/MIN/1.73M2
GLUCOSE BLD-MCNC: 82 MG/DL (ref 70–125)
HDLC SERPL-MCNC: 68 MG/DL
LDLC SERPL CALC-MCNC: 123 MG/DL
POTASSIUM BLD-SCNC: 4 MMOL/L (ref 3.5–5)
SODIUM SERPL-SCNC: 141 MMOL/L (ref 136–145)
TRIGL SERPL-MCNC: 239 MG/DL
TSH SERPL DL<=0.005 MIU/L-ACNC: 2.4 UIU/ML (ref 0.3–5)

## 2021-05-28 ENCOUNTER — RECORDS - HEALTHEAST (OUTPATIENT)
Dept: ADMINISTRATIVE | Facility: CLINIC | Age: 71
End: 2021-05-28

## 2021-05-29 ENCOUNTER — RECORDS - HEALTHEAST (OUTPATIENT)
Dept: ADMINISTRATIVE | Facility: CLINIC | Age: 71
End: 2021-05-29

## 2021-06-01 ENCOUNTER — RECORDS - HEALTHEAST (OUTPATIENT)
Dept: ADMINISTRATIVE | Facility: CLINIC | Age: 71
End: 2021-06-01

## 2021-08-31 ENCOUNTER — LAB REQUISITION (OUTPATIENT)
Dept: LAB | Facility: CLINIC | Age: 71
End: 2021-08-31

## 2021-08-31 DIAGNOSIS — I10 ESSENTIAL (PRIMARY) HYPERTENSION: ICD-10-CM

## 2021-08-31 DIAGNOSIS — E03.9 HYPOTHYROIDISM, UNSPECIFIED: ICD-10-CM

## 2021-08-31 DIAGNOSIS — E78.5 HYPERLIPIDEMIA, UNSPECIFIED: ICD-10-CM

## 2021-08-31 LAB
ANION GAP SERPL CALCULATED.3IONS-SCNC: 10 MMOL/L (ref 5–18)
BUN SERPL-MCNC: 22 MG/DL (ref 8–22)
CALCIUM SERPL-MCNC: 10.9 MG/DL (ref 8.5–10.5)
CHLORIDE BLD-SCNC: 100 MMOL/L (ref 98–107)
CHOLEST SERPL-MCNC: 225 MG/DL
CO2 SERPL-SCNC: 31 MMOL/L (ref 22–31)
CREAT SERPL-MCNC: 0.93 MG/DL (ref 0.6–1.1)
GFR SERPL CREATININE-BSD FRML MDRD: 63 ML/MIN/1.73M2
GLUCOSE BLD-MCNC: 95 MG/DL (ref 70–125)
HDLC SERPL-MCNC: 81 MG/DL
LDLC SERPL CALC-MCNC: 118 MG/DL
POTASSIUM BLD-SCNC: 4 MMOL/L (ref 3.5–5)
SODIUM SERPL-SCNC: 141 MMOL/L (ref 136–145)
TRIGL SERPL-MCNC: 130 MG/DL
TSH SERPL DL<=0.005 MIU/L-ACNC: 1.97 UIU/ML (ref 0.3–5)

## 2021-08-31 PROCEDURE — 84443 ASSAY THYROID STIM HORMONE: CPT | Performed by: FAMILY MEDICINE

## 2021-08-31 PROCEDURE — 80061 LIPID PANEL: CPT | Performed by: FAMILY MEDICINE

## 2021-08-31 PROCEDURE — 80048 BASIC METABOLIC PNL TOTAL CA: CPT | Performed by: FAMILY MEDICINE

## 2022-07-26 ENCOUNTER — LAB REQUISITION (OUTPATIENT)
Dept: LAB | Facility: CLINIC | Age: 72
End: 2022-07-26

## 2022-07-26 DIAGNOSIS — I10 ESSENTIAL (PRIMARY) HYPERTENSION: ICD-10-CM

## 2022-07-26 DIAGNOSIS — E03.9 HYPOTHYROIDISM, UNSPECIFIED: ICD-10-CM

## 2022-07-26 LAB
ANION GAP SERPL CALCULATED.3IONS-SCNC: 12 MMOL/L (ref 7–15)
BUN SERPL-MCNC: 21.4 MG/DL (ref 8–23)
CALCIUM SERPL-MCNC: 10.2 MG/DL (ref 8.8–10.2)
CHLORIDE SERPL-SCNC: 99 MMOL/L (ref 98–107)
CHOLEST SERPL-MCNC: 250 MG/DL
CREAT SERPL-MCNC: 1.16 MG/DL (ref 0.51–0.95)
DEPRECATED HCO3 PLAS-SCNC: 28 MMOL/L (ref 22–29)
GFR SERPL CREATININE-BSD FRML MDRD: 50 ML/MIN/1.73M2
GLUCOSE SERPL-MCNC: 97 MG/DL (ref 70–99)
HDLC SERPL-MCNC: 90 MG/DL
LDLC SERPL CALC-MCNC: 138 MG/DL
NONHDLC SERPL-MCNC: 160 MG/DL
POTASSIUM SERPL-SCNC: 4.8 MMOL/L (ref 3.4–5.3)
SODIUM SERPL-SCNC: 139 MMOL/L (ref 136–145)
TRIGL SERPL-MCNC: 110 MG/DL
TSH SERPL DL<=0.005 MIU/L-ACNC: 3.75 UIU/ML (ref 0.3–4.2)

## 2022-07-26 PROCEDURE — 84443 ASSAY THYROID STIM HORMONE: CPT | Performed by: NURSE PRACTITIONER

## 2022-07-26 PROCEDURE — 80061 LIPID PANEL: CPT | Performed by: NURSE PRACTITIONER

## 2022-07-26 PROCEDURE — 80048 BASIC METABOLIC PNL TOTAL CA: CPT | Performed by: NURSE PRACTITIONER

## 2023-07-14 ENCOUNTER — LAB REQUISITION (OUTPATIENT)
Dept: LAB | Facility: CLINIC | Age: 73
End: 2023-07-14

## 2023-07-14 DIAGNOSIS — E03.9 HYPOTHYROIDISM, UNSPECIFIED: ICD-10-CM

## 2023-07-14 DIAGNOSIS — E78.2 MIXED HYPERLIPIDEMIA: ICD-10-CM

## 2023-07-14 DIAGNOSIS — I10 ESSENTIAL (PRIMARY) HYPERTENSION: ICD-10-CM

## 2023-07-14 LAB
ANION GAP SERPL CALCULATED.3IONS-SCNC: 14 MMOL/L (ref 7–15)
BUN SERPL-MCNC: 15.8 MG/DL (ref 8–23)
CALCIUM SERPL-MCNC: 9.9 MG/DL (ref 8.8–10.2)
CHLORIDE SERPL-SCNC: 100 MMOL/L (ref 98–107)
CHOLEST SERPL-MCNC: 240 MG/DL
CREAT SERPL-MCNC: 1 MG/DL (ref 0.51–0.95)
DEPRECATED HCO3 PLAS-SCNC: 27 MMOL/L (ref 22–29)
GFR SERPL CREATININE-BSD FRML MDRD: 60 ML/MIN/1.73M2
GLUCOSE SERPL-MCNC: 102 MG/DL (ref 70–99)
HDLC SERPL-MCNC: 87 MG/DL
LDLC SERPL CALC-MCNC: 135 MG/DL
NONHDLC SERPL-MCNC: 153 MG/DL
POTASSIUM SERPL-SCNC: 4.1 MMOL/L (ref 3.4–5.3)
SODIUM SERPL-SCNC: 141 MMOL/L (ref 136–145)
TRIGL SERPL-MCNC: 88 MG/DL
TSH SERPL DL<=0.005 MIU/L-ACNC: 2.1 UIU/ML (ref 0.3–4.2)

## 2023-07-14 PROCEDURE — 80061 LIPID PANEL: CPT | Performed by: NURSE PRACTITIONER

## 2023-07-14 PROCEDURE — 80048 BASIC METABOLIC PNL TOTAL CA: CPT | Performed by: NURSE PRACTITIONER

## 2023-07-14 PROCEDURE — 84443 ASSAY THYROID STIM HORMONE: CPT | Performed by: NURSE PRACTITIONER

## 2023-11-16 ENCOUNTER — LAB REQUISITION (OUTPATIENT)
Dept: LAB | Facility: CLINIC | Age: 73
End: 2023-11-16

## 2023-11-16 DIAGNOSIS — I10 ESSENTIAL (PRIMARY) HYPERTENSION: ICD-10-CM

## 2023-11-16 PROCEDURE — 80048 BASIC METABOLIC PNL TOTAL CA: CPT | Performed by: NURSE PRACTITIONER

## 2023-11-17 LAB
ANION GAP SERPL CALCULATED.3IONS-SCNC: 12 MMOL/L (ref 7–15)
BUN SERPL-MCNC: 21.4 MG/DL (ref 8–23)
CALCIUM SERPL-MCNC: 9.3 MG/DL (ref 8.8–10.2)
CHLORIDE SERPL-SCNC: 101 MMOL/L (ref 98–107)
CREAT SERPL-MCNC: 1.18 MG/DL (ref 0.51–0.95)
DEPRECATED HCO3 PLAS-SCNC: 26 MMOL/L (ref 22–29)
EGFRCR SERPLBLD CKD-EPI 2021: 49 ML/MIN/1.73M2
GLUCOSE SERPL-MCNC: 92 MG/DL (ref 70–99)
POTASSIUM SERPL-SCNC: 4.3 MMOL/L (ref 3.4–5.3)
SODIUM SERPL-SCNC: 139 MMOL/L (ref 135–145)

## 2024-07-16 ENCOUNTER — LAB REQUISITION (OUTPATIENT)
Dept: LAB | Facility: CLINIC | Age: 74
End: 2024-07-16

## 2024-07-16 DIAGNOSIS — E78.2 MIXED HYPERLIPIDEMIA: ICD-10-CM

## 2024-07-16 DIAGNOSIS — E03.9 HYPOTHYROIDISM, UNSPECIFIED: ICD-10-CM

## 2024-07-16 DIAGNOSIS — I10 ESSENTIAL (PRIMARY) HYPERTENSION: ICD-10-CM

## 2024-07-16 LAB
ANION GAP SERPL CALCULATED.3IONS-SCNC: 9 MMOL/L (ref 7–15)
BUN SERPL-MCNC: 14.7 MG/DL (ref 8–23)
CALCIUM SERPL-MCNC: 9.7 MG/DL (ref 8.8–10.4)
CHLORIDE SERPL-SCNC: 100 MMOL/L (ref 98–107)
CHOLEST SERPL-MCNC: 222 MG/DL
CREAT SERPL-MCNC: 0.96 MG/DL (ref 0.51–0.95)
EGFRCR SERPLBLD CKD-EPI 2021: 63 ML/MIN/1.73M2
FASTING STATUS PATIENT QL REPORTED: ABNORMAL
FASTING STATUS PATIENT QL REPORTED: ABNORMAL
GLUCOSE SERPL-MCNC: 107 MG/DL (ref 70–99)
HCO3 SERPL-SCNC: 29 MMOL/L (ref 22–29)
HDLC SERPL-MCNC: 71 MG/DL
LDLC SERPL CALC-MCNC: 118 MG/DL
NONHDLC SERPL-MCNC: 151 MG/DL
POTASSIUM SERPL-SCNC: 3.9 MMOL/L (ref 3.4–5.3)
SODIUM SERPL-SCNC: 138 MMOL/L (ref 135–145)
TRIGL SERPL-MCNC: 163 MG/DL
TSH SERPL DL<=0.005 MIU/L-ACNC: 2.25 UIU/ML (ref 0.3–4.2)

## 2024-07-16 PROCEDURE — 80048 BASIC METABOLIC PNL TOTAL CA: CPT | Performed by: NURSE PRACTITIONER

## 2024-07-16 PROCEDURE — 83718 ASSAY OF LIPOPROTEIN: CPT | Performed by: NURSE PRACTITIONER

## 2024-07-16 PROCEDURE — 84443 ASSAY THYROID STIM HORMONE: CPT | Performed by: NURSE PRACTITIONER

## (undated) RX ORDER — NITROGLYCERIN 0.4 MG/1
TABLET SUBLINGUAL
Status: DISPENSED
Start: 2019-10-11

## (undated) RX ORDER — METOPROLOL TARTRATE 50 MG
TABLET ORAL
Status: DISPENSED
Start: 2019-10-11